# Patient Record
Sex: MALE | Race: WHITE | Employment: OTHER | URBAN - NONMETROPOLITAN AREA
[De-identification: names, ages, dates, MRNs, and addresses within clinical notes are randomized per-mention and may not be internally consistent; named-entity substitution may affect disease eponyms.]

---

## 2020-03-05 ENCOUNTER — HOSPITAL ENCOUNTER (OUTPATIENT)
Dept: GENERAL RADIOLOGY | Age: 80
Discharge: HOME | End: 2020-03-07
Payer: MEDICARE

## 2020-03-05 ENCOUNTER — OFFICE VISIT (OUTPATIENT)
Dept: PRIMARY CARE CLINIC | Age: 80
End: 2020-03-05
Payer: MEDICARE

## 2020-03-05 VITALS
HEIGHT: 70 IN | BODY MASS INDEX: 36.79 KG/M2 | OXYGEN SATURATION: 96 % | TEMPERATURE: 98.3 F | SYSTOLIC BLOOD PRESSURE: 138 MMHG | RESPIRATION RATE: 16 BRPM | HEART RATE: 88 BPM | WEIGHT: 257 LBS | DIASTOLIC BLOOD PRESSURE: 88 MMHG

## 2020-03-05 PROCEDURE — 73030 X-RAY EXAM OF SHOULDER: CPT

## 2020-03-05 PROCEDURE — G8417 CALC BMI ABV UP PARAM F/U: HCPCS | Performed by: FAMILY MEDICINE

## 2020-03-05 PROCEDURE — G8427 DOCREV CUR MEDS BY ELIG CLIN: HCPCS | Performed by: FAMILY MEDICINE

## 2020-03-05 PROCEDURE — G8484 FLU IMMUNIZE NO ADMIN: HCPCS | Performed by: FAMILY MEDICINE

## 2020-03-05 PROCEDURE — 1036F TOBACCO NON-USER: CPT | Performed by: FAMILY MEDICINE

## 2020-03-05 PROCEDURE — 99202 OFFICE O/P NEW SF 15 MIN: CPT

## 2020-03-05 PROCEDURE — 99214 OFFICE O/P EST MOD 30 MIN: CPT | Performed by: FAMILY MEDICINE

## 2020-03-05 PROCEDURE — 1123F ACP DISCUSS/DSCN MKR DOCD: CPT | Performed by: FAMILY MEDICINE

## 2020-03-05 PROCEDURE — 4040F PNEUMOC VAC/ADMIN/RCVD: CPT | Performed by: FAMILY MEDICINE

## 2020-03-05 RX ORDER — HYDROCHLOROTHIAZIDE 25 MG/1
TABLET ORAL
COMMUNITY
Start: 2020-02-06

## 2020-03-05 RX ORDER — CHLORAL HYDRATE 500 MG
CAPSULE ORAL
COMMUNITY

## 2020-03-05 RX ORDER — AMLODIPINE BESYLATE 5 MG/1
TABLET ORAL
COMMUNITY
Start: 2020-02-06

## 2020-03-05 RX ORDER — ATORVASTATIN CALCIUM 20 MG/1
TABLET, FILM COATED ORAL
COMMUNITY
Start: 2019-12-02

## 2020-03-05 RX ORDER — RIVAROXABAN 20 MG/1
TABLET, FILM COATED ORAL
COMMUNITY
Start: 2020-02-06

## 2020-03-05 RX ORDER — UBIDECARENONE 75 MG
1 CAPSULE ORAL
COMMUNITY

## 2020-03-05 RX ORDER — BUPROPION HYDROCHLORIDE 150 MG/1
TABLET ORAL
COMMUNITY
Start: 2020-02-05

## 2020-03-05 RX ORDER — RABEPRAZOLE SODIUM 20 MG/1
TABLET, DELAYED RELEASE ORAL
COMMUNITY
Start: 2020-02-07

## 2020-03-05 RX ORDER — FLUOXETINE HYDROCHLORIDE 20 MG/1
60 CAPSULE ORAL
COMMUNITY
Start: 2019-09-05

## 2020-03-05 RX ORDER — METOPROLOL SUCCINATE 50 MG/1
TABLET, EXTENDED RELEASE ORAL
COMMUNITY
Start: 2019-12-22

## 2020-03-05 RX ORDER — MULTIVIT-MIN/IRON/FOLIC/HRB186 3.3 MG-25
TABLET ORAL
COMMUNITY

## 2020-03-05 SDOH — HEALTH STABILITY: MENTAL HEALTH: HOW OFTEN DO YOU HAVE A DRINK CONTAINING ALCOHOL?: MONTHLY OR LESS

## 2020-03-05 ASSESSMENT — ENCOUNTER SYMPTOMS
GASTROINTESTINAL NEGATIVE: 1
SHORTNESS OF BREATH: 0
ALLERGIC/IMMUNOLOGIC NEGATIVE: 1
EYES NEGATIVE: 1
RESPIRATORY NEGATIVE: 1

## 2020-03-05 NOTE — PROGRESS NOTES
Tenderness: There is no abdominal tenderness. Musculoskeletal:      Right shoulder: He exhibits decreased range of motion, tenderness and pain (impingement with abduction). He exhibits no bony tenderness, no swelling, no effusion and no deformity. Left shoulder: He exhibits decreased range of motion, tenderness and swelling (and bruising). He exhibits no crepitus. Arms:    Skin:     General: Skin is warm. Findings: No erythema or rash. Neurological:      Mental Status: He is alert. Psychiatric:         Behavior: Behavior normal.         Thought Content: Thought content normal.         Judgment: Judgment normal.     /88   Pulse 88   Temp 98.3 °F (36.8 °C)   Resp 16   Ht 5' 10\" (1.778 m)   Wt 257 lb (116.6 kg)   SpO2 96%   BMI 36.88 kg/m²   EXAMINATION:   THREE XRAY VIEWS OF THE LEFT SHOULDER       3/5/2020 4:23 pm       COMPARISON:   None.       HISTORY:   ORDERING SYSTEM PROVIDED HISTORY: Acute pain of both shoulders   TECHNOLOGIST PROVIDED HISTORY:   fell monday, injured both shoulders   Reason for Exam: Pt fell 3 days ago; states bilateral shoulder pain; hx of rt   rotator cuff surgery   Acuity: Acute   Type of Exam: Initial       FINDINGS:   Degenerative changes at the acromioclavicular joints.  No acute fracture or   subluxation.  The visualized lung fields are clear.           Impression   No acute osseous abnormality.         THREE XRAY VIEWS OF THE RIGHT SHOULDER       3/5/2020 4:23 pm       COMPARISON:   None.       HISTORY:   ORDERING SYSTEM PROVIDED HISTORY: Acute pain of both shoulders   TECHNOLOGIST PROVIDED HISTORY:   Reason for Exam: Pt fell 3 days ago; states bilateral shoulder pain; hx of rt   rotator cuff surgery   Acuity: Acute   Type of Exam: Initial       FINDINGS:   Anatomic alignment.  No fracture.  No destructive bony abnormality.  Some   deformity of the proximal humerus laterally.  Inferior acromial sclerosis.         Impression   1.  No acute bony or